# Patient Record
Sex: FEMALE | Race: WHITE | NOT HISPANIC OR LATINO | URBAN - METROPOLITAN AREA
[De-identification: names, ages, dates, MRNs, and addresses within clinical notes are randomized per-mention and may not be internally consistent; named-entity substitution may affect disease eponyms.]

---

## 2023-02-07 ENCOUNTER — TELEPHONE ENCOUNTER (OUTPATIENT)
Dept: URBAN - METROPOLITAN AREA SURGERY CENTER 5 | Facility: SURGERY CENTER | Age: 58
End: 2023-02-07

## 2023-02-07 VITALS — WEIGHT: 141 LBS | HEIGHT: 63 IN | BODY MASS INDEX: 24.98 KG/M2

## 2023-02-07 RX ORDER — OMEGA-3 FATTY ACIDS/FISH OIL 360-1200MG
AS DIRECTED CAPSULE ORAL
Status: ACTIVE | COMMUNITY

## 2023-02-07 RX ORDER — OMEPRAZOLE 20 MG/1
1 CAPSULE 30 MINUTES BEFORE MORNING MEAL CAPSULE, DELAYED RELEASE ORAL TWICE A DAY
Status: ACTIVE | COMMUNITY

## 2023-02-07 RX ORDER — CRANBERRY FRUIT EXTRACT 425 MG
AS DIRECTED CAPSULE ORAL
Status: ACTIVE | COMMUNITY

## 2023-02-07 RX ORDER — ECHINACEA 400 MG
AS DIRECTED CAPSULE ORAL
Status: ACTIVE | COMMUNITY

## 2023-02-08 ENCOUNTER — DASHBOARD ENCOUNTERS (OUTPATIENT)
Age: 58
End: 2023-02-08

## 2023-02-14 ENCOUNTER — LAB OUTSIDE AN ENCOUNTER (OUTPATIENT)
Dept: URBAN - METROPOLITAN AREA SURGERY CENTER 5 | Facility: SURGERY CENTER | Age: 58
End: 2023-02-14

## 2023-03-31 ENCOUNTER — CLAIMS CREATED FROM THE CLAIM WINDOW (OUTPATIENT)
Dept: URBAN - METROPOLITAN AREA CLINIC 4 | Facility: CLINIC | Age: 58
End: 2023-03-31
Payer: COMMERCIAL

## 2023-03-31 ENCOUNTER — OFFICE VISIT (OUTPATIENT)
Dept: URBAN - METROPOLITAN AREA SURGERY CENTER 5 | Facility: SURGERY CENTER | Age: 58
End: 2023-03-31
Payer: COMMERCIAL

## 2023-03-31 DIAGNOSIS — K63.5 BENIGN COLON POLYP: ICD-10-CM

## 2023-03-31 DIAGNOSIS — Z12.11 COLON CANCER SCREENING: ICD-10-CM

## 2023-03-31 DIAGNOSIS — K64.0 BLEEDING GRADE I HEMORRHOIDS: ICD-10-CM

## 2023-03-31 DIAGNOSIS — K63.89 OTHER SPECIFIED DISEASES OF INTESTINE: ICD-10-CM

## 2023-03-31 PROCEDURE — 45385 COLONOSCOPY W/LESION REMOVAL: CPT | Performed by: INTERNAL MEDICINE

## 2023-03-31 PROCEDURE — 88305 TISSUE EXAM BY PATHOLOGIST: CPT | Performed by: PATHOLOGY

## 2023-03-31 RX ORDER — CRANBERRY FRUIT EXTRACT 425 MG
AS DIRECTED CAPSULE ORAL
Status: ACTIVE | COMMUNITY

## 2023-03-31 RX ORDER — OMEGA-3 FATTY ACIDS/FISH OIL 360-1200MG
AS DIRECTED CAPSULE ORAL
Status: ACTIVE | COMMUNITY

## 2023-03-31 RX ORDER — OMEPRAZOLE 20 MG/1
1 CAPSULE 30 MINUTES BEFORE MORNING MEAL CAPSULE, DELAYED RELEASE ORAL TWICE A DAY
Status: ACTIVE | COMMUNITY

## 2023-03-31 RX ORDER — ECHINACEA 400 MG
AS DIRECTED CAPSULE ORAL
Status: ACTIVE | COMMUNITY

## 2024-02-14 ENCOUNTER — APPOINTMENT (RX ONLY)
Dept: URBAN - METROPOLITAN AREA CLINIC 333 | Facility: CLINIC | Age: 59
Setting detail: DERMATOLOGY
End: 2024-02-14

## 2024-02-14 DIAGNOSIS — D22 MELANOCYTIC NEVI: ICD-10-CM | Status: INADEQUATELY CONTROLLED

## 2024-02-14 DIAGNOSIS — L82.1 OTHER SEBORRHEIC KERATOSIS: ICD-10-CM | Status: STABLE

## 2024-02-14 DIAGNOSIS — L81.4 OTHER MELANIN HYPERPIGMENTATION: ICD-10-CM | Status: STABLE

## 2024-02-14 PROBLEM — D48.5 NEOPLASM OF UNCERTAIN BEHAVIOR OF SKIN: Status: ACTIVE | Noted: 2024-02-14

## 2024-02-14 PROCEDURE — ? SHAVE REMOVAL

## 2024-02-14 PROCEDURE — 99203 OFFICE O/P NEW LOW 30 MIN: CPT | Mod: 25

## 2024-02-14 PROCEDURE — 11300 SHAVE SKIN LESION 0.5 CM/<: CPT

## 2024-02-14 PROCEDURE — 11300 SHAVE SKIN LESION 0.5 CM/<: CPT | Mod: 76

## 2024-02-14 PROCEDURE — ? COUNSELING

## 2024-02-14 ASSESSMENT — LOCATION DETAILED DESCRIPTION DERM
LOCATION DETAILED: INFERIOR THORACIC SPINE
LOCATION DETAILED: RIGHT INFERIOR UPPER BACK
LOCATION DETAILED: RIGHT SUPERIOR LATERAL MIDBACK
LOCATION DETAILED: PERIUMBILICAL SKIN

## 2024-02-14 ASSESSMENT — LOCATION SIMPLE DESCRIPTION DERM
LOCATION SIMPLE: ABDOMEN
LOCATION SIMPLE: RIGHT LOWER BACK
LOCATION SIMPLE: UPPER BACK
LOCATION SIMPLE: RIGHT UPPER BACK

## 2024-02-14 ASSESSMENT — LOCATION ZONE DERM
LOCATION ZONE: TRUNK
LOCATION ZONE: TRUNK

## 2024-02-14 NOTE — PROCEDURE: SHAVE REMOVAL
Medical Necessity Information: It is in your best interest to select a reason for this procedure from the list below. All of these items fulfill various CMS LCD requirements except the new and changing color options.
Medical Necessity Clause: This procedure was medically necessary because the lesion that was treated was:
Lab: 6
Lab Facility: 3
Detail Level: Detailed
Was A Bandage Applied: Yes
Size Of Lesion In Cm (Required): 0.4
X Size Of Lesion In Cm (Optional): 0
Depth Of Shave: dermis
Biopsy Method: 15 blade
Anesthesia Type: 1% lidocaine with epinephrine
Anesthesia Volume In Cc: 0.5
Hemostasis: Electrocautery
Wound Care: Vaseline
Render Path Notes In Note?: No
Consent: Written consent was obtained from the patient. The risks and benefits to therapy were discussed in detail. Specifically, the risks of infection, scarring, bleeding, prolonged wound healing, incomplete removal, allergy to anesthesia, nerve injury and recurrence were addressed.
Post-Care Instructions: 1. Keep the wound dry and covered with a bandage for the first 24 hours after procedure. Thereafter, change the bandage twice a day. Gently clean the wound with water and then gently pat the wound dry. Gently apply a thick layer of Vaseline to the wound and cover with a bandage, 2 days after your biopsy. If the bandage gets wet, replace it with a dry bandage. For facial biopsies, the area may remain uncovered after 3-4 days. For other areas of the body, we recommend covering the wound for 7 days. After 7 days, the wound can remain uncovered.\\n\\n2. We do not recommend Neosporin in our practice due to the percentage of the population that can develop an allergy to it.\\n\\n3. Showering is fine; if the bandage gets wet, just replace it with a dry bandage (no soaking in a bathtub or a pool for 5 days).\\n\\n4. If bruising is a concern, Arnica is a supplement that minimizes bruising. To prevent bruising, the supplement can be taken once daily for 1 week prior to the procedure. This can also be taken starting on the same day as the procedure to minimize bruising and quicken the healing process if bruises do appear. If starting on the same day as the procedure, please follow instructions on the package. This supplement is available at local vitamin shops.\\n\\n5. Your results may take up to 14 days to come in. Our office will call you with results at this time. In some cases, the biopsy will indicate that more skin must be removed in order to remove abnormal cells. If this is the case, you will then be asked to return to the office for additional information and/or treatment.\\n\\nOnce the wound has healed, to help with minimizing the scar, a product called Scar Recovery Gel can be applied twice a day to the area. This product will decrease the likelihood of the formation of a hypertrophic scar, and decrease the appearance of red or pink pigment changes in the scar. The gel has also been shown to significantly decrease itching while the wound heals. Your provider will discuss this product with you after your biopsy results have come in.\\n\\n** Call us if you experience any problems or have any questions.** If it is after hours and you are having bleeding or problems with your wound, please go to the nearest emergency room.
Notification Instructions: Patient will be notified of pathology results. However, patient instructed to call the office if not contacted within 2 weeks.
Billing Type: Third-Party Bill
Size Of Lesion In Cm (Required): 0.3

## 2024-02-14 NOTE — HPI: SKIN LESION
What Type Of Note Output Would You Prefer (Optional)?: Bullet Format
Has Your Skin Lesion Been Treated?: not been treated
Is This A New Presentation, Or A Follow-Up?: Growths
Additional History: \\n\\nPatient declined FBSC. Just requests back eval.

## 2024-02-14 NOTE — HPI: SKIN LESION
What Type Of Note Output Would You Prefer (Optional)?: Bullet Format
Is This A New Presentation, Or A Follow-Up?: Mole
Additional History: \\n\\nContinuation from other visit.